# Patient Record
(demographics unavailable — no encounter records)

---

## 2024-12-04 NOTE — PLAN
[FreeTextEntry1] :  #HCM  -Breast self exam reviewed and taught  -STI Screening today declined  -Pap/HPV today  -Rx for mammogram and breast sonogram given  -UTD w/ colonoscopy  -Immunizations: UTD  #Perimenopausal vasomotor symptoms? -hot flashes of unclear etiology as pt is currently on OCPs -Recommended Thermella  -Advised to follow with endocrinologist to check TFTs next week as case of hot flashes.  #hair loss unclear etiology, recommend f/u with dermatologist  -f/u TFTs  #BTB on OCPs -Advised to Aurovela consistently at the same time.  -Advised to check if sxs get worse during the placebo week- to monitor. If symptoms  -TVUS to be scheduled to r/o structural causes for AUB.   #PHQ9=9 -Therapist recommendations given.   RTO for 1-2 mo follow up and for TVUS   JOSE ANTONIO Palumbo MD

## 2024-12-04 NOTE — HISTORY OF PRESENT ILLNESS
[Patient reported colonoscopy was normal] : Patient reported colonoscopy was normal [FreeTextEntry1] :  2024. OJ TORRES 47 year old female  LMP 2024 presents for an annual gyn exam. PMH of hypothyroidism.   She has breakthrough bleeding on Junel Fe 1/20. She states that sometimes her pad was so heavy that she has to change a pad every 30 minutes, but sometimes it is only spotting. She also states that she does not take it at a consistent time every day. She usually takes it within a 2 hour period, but does not take it consistently at the same time. She reports that her hot flashes and symptoms have increased in 2024.  No vaginal discharge or vaginitis symptoms. She denies abdominal or pelvic pain. No urinary complaints. BM is normal per patient.   Pt reports hair loss, mood swings, hot flashes, and weight gain. Sexually active, uses OCPs.  Denies changes in medical status, medications, serious illness, hospitalizations, and surgeries. Pt has not seen her dermatologist recently.   OBHx:  GynHx: Fibroids PMH: Hypothyroidism, SHx: Appendectomy Meds: Unithyroid, Aurovela   All: NKDA Soc: No drug, or tobacco use, non-smoker. h/o Cigarette use but quit in , occasional alcohol use. Psych: negative FHx: Heart disease and colon ca (father), Denies FHx of breast, ovarian, uterine or colon cancer. PHQ9=9  [TextBox_4] : TVUS 06/2022: IUD in place, fibroids [Mammogramdate] : 06/2022 [TextBox_19] : BIRADS 1 [PapSmeardate] : 10/2023 [TextBox_31] : NILM HPV neg [ColonoscopyDate] : 2022 [TextBox_43] : return in 5 yrs

## 2024-12-04 NOTE — PHYSICAL EXAM
[Chaperone Present] : A chaperone was present in the examining room during all aspects of the physical examination [[id number]] : A chaperone was present during the pelvic exam. [Appropriately responsive] : appropriately responsive [Alert] : alert [No Acute Distress] : no acute distress [No Lymphadenopathy] : no lymphadenopathy [Regular Rate Rhythm] : regular rate rhythm [No Murmurs] : no murmurs [Clear to Auscultation B/L] : clear to auscultation bilaterally [Soft] : soft [Non-tender] : non-tender [Non-distended] : non-distended [No HSM] : No HSM [No Lesions] : no lesions [No Mass] : no mass [Oriented x3] : oriented x3 [Examination Of The Breasts] : a normal appearance [No Masses] : no breast masses were palpable [Labia Majora] : normal [Labia Minora] : normal [Normal] : normal [Uterine Adnexae] : normal [FreeTextEntry1] : small vulvar folliculitis noted [FreeTextEntry2] :  Denise castellanos) [FreeTextEntry9] :  Guaiac test negative, no masses noted